# Patient Record
Sex: FEMALE | Race: WHITE | NOT HISPANIC OR LATINO | Employment: UNEMPLOYED | URBAN - METROPOLITAN AREA
[De-identification: names, ages, dates, MRNs, and addresses within clinical notes are randomized per-mention and may not be internally consistent; named-entity substitution may affect disease eponyms.]

---

## 2017-01-16 ENCOUNTER — OFFICE VISIT (OUTPATIENT)
Dept: URGENT CARE | Facility: MEDICAL CENTER | Age: 37
End: 2017-01-16

## 2017-01-16 ENCOUNTER — HOSPITAL ENCOUNTER (OUTPATIENT)
Dept: RADIOLOGY | Facility: MEDICAL CENTER | Age: 37
Discharge: HOME | End: 2017-01-16
Admitting: FAMILY MEDICINE

## 2017-01-16 DIAGNOSIS — S99.929A INJURY OF FOOT: ICD-10-CM

## 2017-01-16 PROCEDURE — G0383 LEV 4 HOSP TYPE B ED VISIT: HCPCS

## 2017-01-16 PROCEDURE — 73630 X-RAY EXAM OF FOOT: CPT

## 2022-02-02 ENCOUNTER — TELEPHONE (OUTPATIENT)
Dept: OTHER | Facility: OTHER | Age: 42
End: 2022-02-02

## 2024-10-08 ENCOUNTER — TELEPHONE (OUTPATIENT)
Age: 44
End: 2024-10-08

## 2024-10-08 NOTE — TELEPHONE ENCOUNTER
Unable to reach Dr. Spain's office, voicemail is currently full and I am unable to leave a message or speak to a live person.

## 2024-10-08 NOTE — TELEPHONE ENCOUNTER
Called the [employer] phone number and an automated message to call the following number for medical records from Dr. Hauser was provided - [phone]. I attempted to call that number and received another automated message that the mail box is full. I tried to remain on the line for an  but the message just kept repeating. I was unable to reach anyone at this time.

## 2024-10-08 NOTE — TELEPHONE ENCOUNTER
Called the [employer] phone number again and left a detailed message for someone to return my call and ask to be put through to the [place] office. The number for Dr. Hauser has a full mail box and I would like to know the next step for receiving the patient's medical records.

## 2024-10-08 NOTE — TELEPHONE ENCOUNTER
Called and spoke with patient making her aware that I was unable to speak with anyone. Advised patient to also call and leave a detailed vm. Patient stated she would do that today, 10/8/2024.

## 2024-10-08 NOTE — TELEPHONE ENCOUNTER
Called Dr. Hauser office in [place], [place] at [phone]- Peggy stated that the [place] office does NOT have anything with the office in [place] and further information would not be provided. Once I express the need for patient's medical records to continuity of care she provided me with another # for [employer] [phone] which I was able to speak with a live person named Sharon, she advised that [employer] is not holding or managing medical records for Dr. Hauser but the # to call is [phone]. This is the same # that we have been trying to call and mailbox is currently full and we have not been able to leave a message, per Sharon with [employer] this has been a constant issue and we should try to continue calling for the chance to leave a voice message as they clear their voicemail. Will f/up as needed.

## 2024-10-08 NOTE — TELEPHONE ENCOUNTER
NP called stating she attempted to retrieve previous MR's for upcoming visit. Pt was prompted to have office obtain records from [facility] - [phone]. She is a previous patient of Dr. Hauser. NP appt scheduled for 1/14/25.    Thank you.

## 2025-01-14 ENCOUNTER — OFFICE VISIT (OUTPATIENT)
Dept: OBGYN CLINIC | Facility: CLINIC | Age: 45
End: 2025-01-14
Payer: COMMERCIAL

## 2025-01-14 VITALS
WEIGHT: 152.2 LBS | SYSTOLIC BLOOD PRESSURE: 130 MMHG | BODY MASS INDEX: 25.36 KG/M2 | DIASTOLIC BLOOD PRESSURE: 80 MMHG | HEIGHT: 65 IN

## 2025-01-14 DIAGNOSIS — Z12.4 PAP SMEAR FOR CERVICAL CANCER SCREENING: ICD-10-CM

## 2025-01-14 DIAGNOSIS — Z01.419 ENCOUNTER FOR WELL WOMAN EXAM WITH ROUTINE GYNECOLOGICAL EXAM: Primary | ICD-10-CM

## 2025-01-14 PROCEDURE — G0145 SCR C/V CYTO,THINLAYER,RESCR: HCPCS | Performed by: OBSTETRICS & GYNECOLOGY

## 2025-01-14 PROCEDURE — 99386 PREV VISIT NEW AGE 40-64: CPT | Performed by: OBSTETRICS & GYNECOLOGY

## 2025-01-14 RX ORDER — MULTIVITAMIN
1 TABLET ORAL DAILY
COMMUNITY

## 2025-01-14 RX ORDER — CETIRIZINE HYDROCHLORIDE 10 MG/1
10 TABLET, CHEWABLE ORAL DAILY
COMMUNITY

## 2025-01-14 RX ORDER — BUTALBITAL, ACETAMINOPHEN AND CAFFEINE 50; 325; 40 MG/1; MG/1; MG/1
TABLET ORAL
COMMUNITY
Start: 2025-01-02

## 2025-01-14 RX ORDER — ALPRAZOLAM 0.25 MG/1
0.25 TABLET ORAL DAILY PRN
COMMUNITY
Start: 2024-09-10

## 2025-01-14 NOTE — PROGRESS NOTES
"[facility]  [address], [address], [place]    ASSESSMENT/PLAN: Claudia Tapia is a 44 y.o.  who presents for annual gynecologic exam.    Encounter for routine gynecologic examination  - Routine well woman exam completed today.  - Cervical Cancer Screening: Current ASCCP Guidelines reviewed. Last Pap: Not on file . Next Pap Due: today  - Contraceptive counseling discussed.  Current contraception: Bilateral salpingectomy:   - Breast Cancer Screening: Last Mammogram 10/10/2024, mammo ordered    Additional problems addressed at this visit:  1. Encounter for well woman exam with routine gynecological exam  2. Pap smear for cervical cancer screening  -     Liquid-based pap, screening; Future        CC:  Annual Gynecologic Examination    HPI: Claudia Tapia is a 44 y.o.  who presents for annual gynecologic examination.  HPI  Patient presents for routine annual exam, was a patient of Dr. Hauser.   States her cycles have become more irregular. Also reports hot flashes and night sweats.    The following portions of the patient's history were reviewed and updated as appropriate: allergies, current medications, past family history, past medical history, obstetric history, gynecologic history, past social history, past surgical history and problem list.    Review of Systems   Constitutional: Negative.    HENT: Negative.     Eyes: Negative.    Respiratory: Negative.     Cardiovascular: Negative.    Gastrointestinal: Negative.    Genitourinary: Negative.    Musculoskeletal: Negative.    All other systems reviewed and are negative.        Objective:  /80 (BP Location: Right arm, Patient Position: Sitting, Cuff Size: Standard)   Ht 5' 5\" (1.651 m)   Wt 69 kg (152 lb 3.2 oz)   LMP 2024 (Exact Date)   BMI 25.33 kg/m²    Physical Exam  Vitals reviewed.   Constitutional:       General: She is not in acute distress.     Appearance: She is well-developed.   HENT:    "   Head: Normocephalic and atraumatic.      Nose: Nose normal.   Cardiovascular:      Rate and Rhythm: Normal rate.   Pulmonary:      Effort: Pulmonary effort is normal. No respiratory distress.   Chest:   Breasts:     Breasts are symmetrical.      Right: Normal. No mass, nipple discharge, skin change or tenderness.      Left: Normal. No mass, nipple discharge, skin change or tenderness.   Abdominal:      General: There is no distension.      Palpations: Abdomen is soft. There is no mass.      Tenderness: There is no abdominal tenderness. There is no guarding or rebound.   Genitourinary:     General: Normal vulva.      Exam position: Lithotomy position.      Labia:         Right: No lesion.         Left: No lesion.       Urethra: No prolapse (urethral meatus normal).      Vagina: Normal. No vaginal discharge, erythema or bleeding.      Cervix: Normal.      Uterus: Normal.       Adnexa: Right adnexa normal and left adnexa normal.   Musculoskeletal:         General: Normal range of motion.      Cervical back: Normal range of motion.   Lymphadenopathy:      Upper Body:      Right upper body: No supraclavicular, axillary or pectoral adenopathy.      Left upper body: No supraclavicular, axillary or pectoral adenopathy.      Lower Body: No right inguinal adenopathy. No left inguinal adenopathy.   Skin:     General: Skin is warm and dry.   Neurological:      Mental Status: She is alert and oriented to person, place, and time.   Psychiatric:         Behavior: Behavior normal.         Thought Content: Thought content normal.         Judgment: Judgment normal.             Shauna Shabazz MD  [facility]  01/14/25 4:02 PM

## 2025-01-20 LAB
LAB AP GYN PRIMARY INTERPRETATION: NORMAL
Lab: NORMAL

## 2025-01-28 ENCOUNTER — RESULTS FOLLOW-UP (OUTPATIENT)
Dept: OTHER | Facility: HOSPITAL | Age: 45
End: 2025-01-28